# Patient Record
Sex: MALE | Race: WHITE | NOT HISPANIC OR LATINO | Employment: OTHER | URBAN - METROPOLITAN AREA
[De-identification: names, ages, dates, MRNs, and addresses within clinical notes are randomized per-mention and may not be internally consistent; named-entity substitution may affect disease eponyms.]

---

## 2017-04-07 ENCOUNTER — APPOINTMENT (OUTPATIENT)
Dept: RADIOLOGY | Facility: MEDICAL CENTER | Age: 72
End: 2017-04-07
Attending: EMERGENCY MEDICINE
Payer: MEDICARE

## 2017-04-07 ENCOUNTER — HOSPITAL ENCOUNTER (EMERGENCY)
Facility: MEDICAL CENTER | Age: 72
End: 2017-04-07
Attending: EMERGENCY MEDICINE
Payer: MEDICARE

## 2017-04-07 VITALS
OXYGEN SATURATION: 100 % | HEART RATE: 47 BPM | TEMPERATURE: 97.8 F | SYSTOLIC BLOOD PRESSURE: 151 MMHG | WEIGHT: 209.44 LBS | BODY MASS INDEX: 26.88 KG/M2 | RESPIRATION RATE: 20 BRPM | HEIGHT: 74 IN | DIASTOLIC BLOOD PRESSURE: 103 MMHG

## 2017-04-07 DIAGNOSIS — I10 ESSENTIAL HYPERTENSION: ICD-10-CM

## 2017-04-07 DIAGNOSIS — I48.20 CHRONIC ATRIAL FIBRILLATION (HCC): ICD-10-CM

## 2017-04-07 DIAGNOSIS — G44.201 INTRACTABLE TENSION-TYPE HEADACHE, UNSPECIFIED CHRONICITY PATTERN: ICD-10-CM

## 2017-04-07 LAB
ALBUMIN SERPL BCP-MCNC: 4.4 G/DL (ref 3.2–4.9)
ALBUMIN/GLOB SERPL: 1.6 G/DL
ALP SERPL-CCNC: 63 U/L (ref 30–99)
ALT SERPL-CCNC: 18 U/L (ref 2–50)
ANION GAP SERPL CALC-SCNC: 5 MMOL/L (ref 0–11.9)
APTT PPP: 32.6 SEC (ref 24.7–36)
AST SERPL-CCNC: 20 U/L (ref 12–45)
BASOPHILS # BLD AUTO: 0.4 % (ref 0–1.8)
BASOPHILS # BLD: 0.03 K/UL (ref 0–0.12)
BILIRUB SERPL-MCNC: 0.7 MG/DL (ref 0.1–1.5)
BUN SERPL-MCNC: 26 MG/DL (ref 8–22)
CALCIUM SERPL-MCNC: 9.4 MG/DL (ref 8.5–10.5)
CHLORIDE SERPL-SCNC: 111 MMOL/L (ref 96–112)
CO2 SERPL-SCNC: 22 MMOL/L (ref 20–33)
CREAT SERPL-MCNC: 1.12 MG/DL (ref 0.5–1.4)
EOSINOPHIL # BLD AUTO: 0.13 K/UL (ref 0–0.51)
EOSINOPHIL NFR BLD: 1.7 % (ref 0–6.9)
ERYTHROCYTE [DISTWIDTH] IN BLOOD BY AUTOMATED COUNT: 49.9 FL (ref 35.9–50)
GFR SERPL CREATININE-BSD FRML MDRD: >60 ML/MIN/1.73 M 2
GLOBULIN SER CALC-MCNC: 2.8 G/DL (ref 1.9–3.5)
GLUCOSE SERPL-MCNC: 132 MG/DL (ref 65–99)
HCT VFR BLD AUTO: 40.6 % (ref 42–52)
HGB BLD-MCNC: 13.3 G/DL (ref 14–18)
IMM GRANULOCYTES # BLD AUTO: 0.02 K/UL (ref 0–0.11)
IMM GRANULOCYTES NFR BLD AUTO: 0.3 % (ref 0–0.9)
INR PPP: 2.25 (ref 0.87–1.13)
LYMPHOCYTES # BLD AUTO: 1.8 K/UL (ref 1–4.8)
LYMPHOCYTES NFR BLD: 23.6 % (ref 22–41)
MCH RBC QN AUTO: 30.5 PG (ref 27–33)
MCHC RBC AUTO-ENTMCNC: 32.8 G/DL (ref 33.7–35.3)
MCV RBC AUTO: 93.1 FL (ref 81.4–97.8)
MONOCYTES # BLD AUTO: 0.55 K/UL (ref 0–0.85)
MONOCYTES NFR BLD AUTO: 7.2 % (ref 0–13.4)
NEUTROPHILS # BLD AUTO: 5.1 K/UL (ref 1.82–7.42)
NEUTROPHILS NFR BLD: 66.8 % (ref 44–72)
NRBC # BLD AUTO: 0 K/UL
NRBC BLD AUTO-RTO: 0 /100 WBC
PLATELET # BLD AUTO: 166 K/UL (ref 164–446)
PMV BLD AUTO: 10.3 FL (ref 9–12.9)
POTASSIUM SERPL-SCNC: 4.1 MMOL/L (ref 3.6–5.5)
PROT SERPL-MCNC: 7.2 G/DL (ref 6–8.2)
PROTHROMBIN TIME: 25.6 SEC (ref 12–14.6)
RBC # BLD AUTO: 4.36 M/UL (ref 4.7–6.1)
SODIUM SERPL-SCNC: 138 MMOL/L (ref 135–145)
TROPONIN I SERPL-MCNC: 0.02 NG/ML (ref 0–0.04)
WBC # BLD AUTO: 7.6 K/UL (ref 4.8–10.8)

## 2017-04-07 PROCEDURE — 99284 EMERGENCY DEPT VISIT MOD MDM: CPT

## 2017-04-07 PROCEDURE — 85610 PROTHROMBIN TIME: CPT

## 2017-04-07 PROCEDURE — 85025 COMPLETE CBC W/AUTO DIFF WBC: CPT

## 2017-04-07 PROCEDURE — 84484 ASSAY OF TROPONIN QUANT: CPT

## 2017-04-07 PROCEDURE — 36415 COLL VENOUS BLD VENIPUNCTURE: CPT

## 2017-04-07 PROCEDURE — 93005 ELECTROCARDIOGRAM TRACING: CPT | Performed by: EMERGENCY MEDICINE

## 2017-04-07 PROCEDURE — 80053 COMPREHEN METABOLIC PANEL: CPT

## 2017-04-07 PROCEDURE — 70450 CT HEAD/BRAIN W/O DYE: CPT

## 2017-04-07 PROCEDURE — 71010 DX-CHEST-PORTABLE (1 VIEW): CPT

## 2017-04-07 PROCEDURE — 85730 THROMBOPLASTIN TIME PARTIAL: CPT

## 2017-04-07 RX ORDER — IBUPROFEN 400 MG/1
1000 TABLET ORAL 2 TIMES DAILY
COMMUNITY

## 2017-04-07 RX ORDER — LOSARTAN POTASSIUM 50 MG/1
100 TABLET ORAL DAILY
COMMUNITY

## 2017-04-07 RX ORDER — GLIMEPIRIDE 4 MG/1
4 TABLET ORAL EVERY MORNING
COMMUNITY

## 2017-04-07 RX ORDER — CLONIDINE HYDROCHLORIDE 0.1 MG/1
0.1 TABLET ORAL 2 TIMES DAILY
Qty: 30 TAB | Refills: 0 | Status: SHIPPED | OUTPATIENT
Start: 2017-04-07

## 2017-04-07 ASSESSMENT — PAIN SCALES - GENERAL: PAINLEVEL_OUTOF10: 3

## 2017-04-07 NOTE — ED NOTES
Pt ambulated to triage with   Chief Complaint   Patient presents with   • Head Ache     for 3 weeks   • Blood Pressure Problem     as high as 208/114   • Other     visiting here from [place] for the last 3 wks.  pt reports BP started increasing prior to leaving and PCP increased his BP medications prior to traveling.     Pt reports BP improves after exercise.  Pt has h/o CVA, AFib and HTN.  Pt taking medications as prescribed.  Pt on coumadin for afib.  Pt reports general ha, pt denies vision changes.  No N/T or weakness.  Pt Informed regarding triage process and verbalized understanding to inform triage tech or RN for any changes in condition. Placed in lobby.

## 2017-04-07 NOTE — ED AVS SNAPSHOT
Home Care Instructions                                                                                                                Rui Miller   MRN: [id number]    Department:  [facility], Emergency Dept   Date of Visit:  4/7/2017            [facility], Emergency Dept    [address] [place] [id number]    Phone:  [phone]      You were seen by     Neal Rincon M.D.      Your Diagnosis Was     Intractable tension-type headache, unspecified chronicity pattern     G44.201       Follow-up Information     1. Follow up with [facility], Emergency Dept In 1 day.    Specialty:  Emergency Medicine    Why:  As needed, If symptoms worsen    Contact information    [address] [place] [id number] [phone]      Medication Information     Review all of your home medications and newly ordered medications with your primary doctor and/or pharmacist as soon as possible. Follow medication instructions as directed by your doctor and/or pharmacist.     Please keep your complete medication list with you and share with your physician. Update the information when medications are discontinued, doses are changed, or new medications (including over-the-counter products) are added; and carry medication information at all times in the event of emergency situations.               Medication List      START taking these medications        Instructions    Morning Afternoon Evening Bedtime    clonidine 0.1 MG Tabs   Commonly known as:  CATAPRES        Doctor's comments:  As needed twice daily for systolic blood pressures greater than 160   Take 1 Tab by mouth 2 times a day.   Dose:  0.1 mg                          ASK your doctor about these medications        Instructions    Morning Afternoon Evening Bedtime    glimepiride 4 MG Tabs   Commonly known as:  AMARYL        Take 4 mg by mouth every morning.   Dose:  4 mg                        ibuprofen 400 MG  Tabs   Commonly known as:  MOTRIN        Take 1,000 mg by mouth 2 times a day.   Dose:  1000 mg                        losartan 50 MG Tabs   Commonly known as:  COZAAR        Take 100 mg by mouth every day.   Dose:  100 mg                        NON SPECIFIED        Indications: a statin                             Where to Get Your Medications      You can get these medications from any pharmacy     Bring a paper prescription for each of these medications    - clonidine 0.1 MG Tabs            Procedures and tests performed during your visit     APTT    CBC WITH DIFFERENTIAL    COMP METABOLIC PANEL    CT-HEAD W/O    DX-CHEST-PORTABLE (1 VIEW)    EKG (ER)    ESTIMATED GFR    PROTHROMBIN TIME    SALINE LOCK    TROPONIN        Discharge Instructions       Atrial Fibrillation  Atrial fibrillation is a condition that causes your heart to beat irregularly. It may also cause your heart to beat faster than normal. Atrial fibrillation can prevent your heart from pumping blood normally. It increases your risk of stroke and heart problems.  HOME CARE  · Take medications as told by your doctor.  · Only take medications that your doctor says are safe. Some medications can make the condition worse or happen again.  · If blood thinners were prescribed by your doctor, take them exactly as told. Too much can cause bleeding. Too little and you will not have the needed protection against stroke and other problems.  · Perform blood tests at home if told by your doctor.  · Perform blood tests exactly as told by your doctor.  · Do not drink alcohol.  · Do not drink beverages with caffeine such as coffee, soda, and some teas.  · Maintain a healthy weight.  · Do not use diet pills unless your doctor says they are safe. They may make heart problems worse.  · Follow diet instructions as told by your doctor.  · Exercise regularly as told by your doctor.  · Keep all follow-up appointments.  GET HELP IF:  · You notice a change in the speed,  "rhythm, or strength of your heartbeat.  · You suddenly begin peeing (urinating) more often.  · You get tired more easily when moving or exercising.  GET HELP RIGHT AWAY IF:   · You have chest or belly (abdominal) pain.  · You feel sick to your stomach (nauseous).  · You are short of breath.  · You suddenly have swollen feet and ankles.  · You feel dizzy.  · You face, arms, or legs feel numb or weak.  · There is a change in your vision or speech.  MAKE SURE YOU:   · Understand these instructions.  · Will watch your condition.  · Will get help right away if you are not doing well or get worse.     This information is not intended to replace advice given to you by your health care provider. Make sure you discuss any questions you have with your health care provider.     Document Released: 09/26/2009 Document Revised: 01/08/2016 Document Reviewed: 04/13/2016  [employer] Interactive Patient Education ©2016 [employer]  Headaches, Frequently Asked Questions  MIGRAINE HEADACHES  Q: What is migraine? What causes it? How can I treat it?  A: Generally, migraine headaches begin as a dull ache. Then they develop into a constant, throbbing, and pulsating pain. You may experience pain at the temples. You may experience pain at the front or back of one or both sides of the head. The pain is usually accompanied by a combination of:  · Nausea.   · Vomiting.   · Sensitivity to light and noise.   Some people (about 15%) experience an aura (see below) before an attack. The cause of migraine is believed to be chemical reactions in the brain. Treatment for migraine may include over-the-counter or prescription medications. It may also include self-help techniques. These include relaxation training and biofeedback.   Q: What is an aura?  A: About 15% of people with migraine get an \"aura\". This is a sign of neurological symptoms that occur before a migraine headache. You may see wavy or jagged lines, dots, or flashing lights. You might " "experience tunnel vision or blind spots in one or both eyes. The aura can include visual or auditory hallucinations (something imagined). It may include disruptions in smell (such as strange odors), taste or touch. Other symptoms include:  · Numbness.   · A \"pins and needles\" sensation.   · Difficulty in recalling or speaking the correct word.   These neurological events may last as long as 60 minutes. These symptoms will fade as the headache begins.  Q: What is a trigger?  A: Certain physical or environmental factors can lead to or \"trigger\" a migraine. These include:  · Foods.   · Hormonal changes.   · Weather.   · Stress.   It is important to remember that triggers are different for everyone. To help prevent migraine attacks, you need to figure out which triggers affect you. Keep a headache diary. This is a good way to track triggers. The diary will help you talk to your healthcare professional about your condition.  Q: Does weather affect migraines?  A: Bright sunshine, hot, humid conditions, and drastic changes in barometric pressure may lead to, or \"trigger,\" a migraine attack in some people. But studies have shown that weather does not act as a trigger for everyone with migraines.  Q: What is the link between migraine and hormones?  A: Hormones start and regulate many of your body's functions. Hormones keep your body in balance within a constantly changing environment. The levels of hormones in your body are unbalanced at times. Examples are during menstruation, pregnancy, or menopause. That can lead to a migraine attack. In fact, about three quarters of all women with migraine report that their attacks are related to the menstrual cycle.   Q: Is there an increased risk of stroke for migraine sufferers?  A: The likelihood of a migraine attack causing a stroke is very remote. That is not to say that migraine sufferers cannot have a stroke associated with their migraines. In persons under age 40, the most " "common associated factor for stroke is migraine headache. But over the course of a person's normal life span, the occurrence of migraine headache may actually be associated with a reduced risk of dying from cerebrovascular disease due to stroke.   Q: What are acute medications for migraine?  A: Acute medications are used to treat the pain of the headache after it has started. Examples over-the-counter medications, NSAIDs, ergots, and triptans.   Q: What are the triptans?  A: Triptans are the newest class of abortive medications. They are specifically targeted to treat migraine. Triptans are vasoconstrictors. They moderate some chemical reactions in the brain. The triptans work on receptors in your brain. Triptans help to restore the balance of a neurotransmitter called serotonin. Fluctuations in levels of serotonin are thought to be a main cause of migraine.   Q: Are over-the-counter medications for migraine effective?  A: Over-the-counter, or \"OTC,\" medications may be effective in relieving mild to moderate pain and associated symptoms of migraine. But you should see your caregiver before beginning any treatment regimen for migraine.   Q: What are preventive medications for migraine?  A: Preventive medications for migraine are sometimes referred to as \"prophylactic\" treatments. They are used to reduce the frequency, severity, and length of migraine attacks. Examples of preventive medications include antiepileptic medications, antidepressants, beta-blockers, calcium channel blockers, and NSAIDs (nonsteroidal anti-inflammatory drugs).  Q: Why are anticonvulsants used to treat migraine?  A: During the past few years, there has been an increased interest in antiepileptic drugs for the prevention of migraine. They are sometimes referred to as \"anticonvulsants\". Both epilepsy and migraine may be caused by similar reactions in the brain.   Q: Why are antidepressants used to treat migraine?  A: Antidepressants are " "typically used to treat people with depression. They may reduce migraine frequency by regulating chemical levels, such as serotonin, in the brain.   Q: What alternative therapies are used to treat migraine?  A: The term \"alternative therapies\" is often used to describe treatments considered outside the scope of conventional Western medicine. Examples of alternative therapy include acupuncture, acupressure, and yoga. Another common alternative treatment is herbal therapy. Some herbs are believed to relieve headache pain. Always discuss alternative therapies with your caregiver before proceeding. Some herbal products contain arsenic and other toxins.  TENSION HEADACHES  Q: What is a tension-type headache? What causes it? How can I treat it?  A: Tension-type headaches occur randomly. They are often the result of temporary stress, anxiety, fatigue, or anger. Symptoms include soreness in your temples, a tightening band-like sensation around your head (a \"vice-like\" ache). Symptoms can also include a pulling feeling, pressure sensations, and joe head and neck muscles. The headache begins in your forehead, temples, or the back of your head and neck. Treatment for tension-type headache may include over-the-counter or prescription medications. Treatment may also include self-help techniques such as relaxation training and biofeedback.  CLUSTER HEADACHES  Q: What is a cluster headache? What causes it? How can I treat it?  A: Cluster headache gets its name because the attacks come in groups. The pain arrives with little, if any, warning. It is usually on one side of the head. A tearing or bloodshot eye and a runny nose on the same side of the headache may also accompany the pain. Cluster headaches are believed to be caused by chemical reactions in the brain. They have been described as the most severe and intense of any headache type. Treatment for cluster headache includes prescription medication and oxygen.  SINUS " "HEADACHES  Q: What is a sinus headache? What causes it? How can I treat it?  A: When a cavity in the bones of the face and skull (a sinus) becomes inflamed, the inflammation will cause localized pain. This condition is usually the result of an allergic reaction, a tumor, or an infection. If your headache is caused by a sinus blockage, such as an infection, you will probably have a fever. An x-ray will confirm a sinus blockage. Your caregiver's treatment might include antibiotics for the infection, as well as antihistamines or decongestants.   REBOUND HEADACHES  Q: What is a rebound headache? What causes it? How can I treat it?  A: A pattern of taking acute headache medications too often can lead to a condition known as \"rebound headache.\" A pattern of taking too much headache medication includes taking it more than 2 days per week or in excessive amounts. That means more than the label or a caregiver advises. With rebound headaches, your medications not only stop relieving pain, they actually begin to cause headaches. Doctors treat rebound headache by tapering the medication that is being overused. Sometimes your caregiver will gradually substitute a different type of treatment or medication. Stopping may be a challenge. Regularly overusing a medication increases the potential for serious side effects. Consult a caregiver if you regularly use headache medications more than 2 days per week or more than the label advises.  ADDITIONAL QUESTIONS AND ANSWERS  Q: What is biofeedback?  A: Biofeedback is a self-help treatment. Biofeedback uses special equipment to monitor your body's involuntary physical responses. Biofeedback monitors:  · Breathing.   · Pulse.   · Heart rate.   · Temperature.   · Muscle tension.   · Brain activity.   Biofeedback helps you refine and perfect your relaxation exercises. You learn to control the physical responses that are related to stress. Once the technique has been mastered, you do not " need the equipment any more.  Q: Are headaches hereditary?  A: Four out of five (80%) of people that suffer report a family history of migraine. Scientists are not sure if this is genetic or a family predisposition. Despite the uncertainty, a child has a 50% chance of having migraine if one parent suffers. The child has a 75% chance if both parents suffer.   Q: Can children get headaches?  A: By the time they reach high school, most young people have experienced some type of headache. Many safe and effective approaches or medications can prevent a headache from occurring or stop it after it has begun.   Q: What type of doctor should I see to diagnose and treat my headache?  A: Start with your primary caregiver. Discuss his or her experience and approach to headaches. Discuss methods of classification, diagnosis, and treatment. Your caregiver may decide to recommend you to a headache specialist, depending upon your symptoms or other physical conditions. Having diabetes, allergies, etc., may require a more comprehensive and inclusive approach to your headache. The National Headache Foundation will provide, upon request, a list of NHF physician members in your state.  Document Released: 03/09/2005 Document Revised: 03/11/2013 Document Reviewed: 08/17/2009  [employer]® Patient Information ©2013 [employer]          Patient Information     Patient Information    Following emergency treatment: all patient requiring follow-up care must return either to a private physician or a clinic if your condition worsens before you are able to obtain further medical attention, please return to the emergency room.     Billing Information    At [facility], we work to make the billing process streamlined for our patients.  Our Representatives are here to answer any questions you may have regarding your hospital bill.  If you have insurance coverage and have supplied your insurance information to us, we will submit a claim to your  insurer on your behalf.  Should you have any questions regarding your bill, we can be reached online or by phone as follows:  Online: You are able pay your bills online or live chat with our representatives about any billing questions you may have. We are here to help Monday - Friday from 8:00am to 7:30pm and 9:00am - 12:00pm on Saturdays.  Please visit [email or web address]  for more information.   Phone:  [phone] or [phone]    Please note that your emergency physician, surgeon, pathologist, radiologist, anesthesiologist, and other specialists are not employed by [facility] and will therefore bill separately for their services.  Please contact them directly for any questions concerning their bills at the numbers below:     Emergency Physician Services:  [phone]  [facility]:  [phone]  [facility]:  [phone]  [facility]:  [phone]    1. Your final bill may vary from the amount quoted upon discharge if all procedures are not complete at that time, or if your doctor has additional procedures of which we are not aware. You will receive an additional bill if you return to the Emergency Department at [facility] for suture removal regardless of the facility of which the sutures were placed.     2. Please arrange for settlement of this account at the emergency registration.    3. All self-pay accounts are due in full at the time of treatment.  If you are unable to meet this obligation then payment is expected within 4-5 days.     4. If you have had radiology studies (CT, X-ray, Ultrasound, MRI), you have received a preliminary result during your emergency department visit. Please contact the radiology department [phone] to receive a copy of your final result. Please discuss the Final result with your primary physician or with the follow up physician provided.     Crisis Hotline:  National Crisis Hotline:   9-100-QHYIPPP or 1-924.392.5698  [place] Crisis Hotline:    1-639.360.1448 or 383-217-2587         ED Discharge Follow Up Questions    1. In order to provide you with very good care, we would like to follow up with a phone call in the next few days.  May we have your permission to contact you?     YES /  NO    2. What is the best phone number to call you? (       )_____-__________    3. What is the best time to call you?      Morning  /  Afternoon  /  Evening                   Patient Signature:  ____________________________________________________________    Date:  ____________________________________________________________

## 2017-04-07 NOTE — ED AVS SNAPSHOT
4/7/2017          Rui Miller  [address] [place] [address]    Dear Rui:    [facility] wants to ensure your discharge home is safe and you or your loved ones have had all your questions answered regarding your care after you leave the hospital.    You may receive a telephone call within two days of your discharge.  This call is to make certain you understand your discharge instructions as well as ensure we provided you with the best care possible during your stay with us.     The call will only last approximately 3-5 minutes and will be done by a nurse.    Once again, we want to ensure your discharge home is safe and that you have a clear understanding of any next steps in your care.  If you have any questions or concerns, please do not hesitate to contact us, we are here for you.  Thank you for choosing [facility] for your healthcare needs.    Sincerely,    Michel Chowdhury    [facility]

## 2017-04-07 NOTE — ED AVS SNAPSHOT
VoyageByMe Access Code: [email or web address]  Expires: 5/7/2017  8:30 PM    VoyageByMe  A secure, online tool to manage your health information     [employer]’s VoyageByMe® is a secure, online tool that connects you to your personalized health information from the privacy of your home -- day or night - making it very easy for you to manage your healthcare. Once the activation process is completed, you can even access your medical information using the VoyageByMe eloisa, which is available for free in the [employer] Eloisa store or Google Play store.     VoyageByMe provides the following levels of access (as shown below):   My Chart Features   [facility] Primary Care Doctor [facility]  Specialists [facility]  Urgent  Care Non-[facility]  Primary Care  Doctor   Email your healthcare team securely and privately 24/7 X X X X   Manage appointments: schedule your next appointment; view details of past/upcoming appointments X      Request prescription refills. X      View recent personal medical records, including lab and immunizations X X X X   View health record, including health history, allergies, medications X X X X   Read reports about your outpatient visits, procedures, consult and ER notes X X X X   See your discharge summary, which is a recap of your hospital and/or ER visit that includes your diagnosis, lab results, and care plan. X X       How to register for VoyageByMe:  1. Go to  [email or web address].  2. Click on the Sign Up Now box, which takes you to the New Member Sign Up page. You will need to provide the following information:  a. Enter your VoyageByMe Access Code exactly as it appears at the top of this page. (You will not need to use this code after you’ve completed the sign-up process. If you do not sign up before the expiration date, you must request a new code.)   b. Enter your date of birth.   c. Enter your home email address.   d. Click Submit, and follow the next screen’s instructions.  3. Create a VoyageByMe ID. This will be your VoyageByMe  login ID and cannot be changed, so think of one that is secure and easy to remember.  4. Create a [employer] password. You can change your password at any time.  5. Enter your Password Reset Question and Answer. This can be used at a later time if you forget your password.   6. Enter your e-mail address. This allows you to receive e-mail notifications when new information is available in [employer].  7. Click Sign Up. You can now view your health information.    For assistance activating your [employer] account, call [phone]

## 2017-04-08 NOTE — DISCHARGE INSTRUCTIONS
Atrial Fibrillation  Atrial fibrillation is a condition that causes your heart to beat irregularly. It may also cause your heart to beat faster than normal. Atrial fibrillation can prevent your heart from pumping blood normally. It increases your risk of stroke and heart problems.  HOME CARE  · Take medications as told by your doctor.  · Only take medications that your doctor says are safe. Some medications can make the condition worse or happen again.  · If blood thinners were prescribed by your doctor, take them exactly as told. Too much can cause bleeding. Too little and you will not have the needed protection against stroke and other problems.  · Perform blood tests at home if told by your doctor.  · Perform blood tests exactly as told by your doctor.  · Do not drink alcohol.  · Do not drink beverages with caffeine such as coffee, soda, and some teas.  · Maintain a healthy weight.  · Do not use diet pills unless your doctor says they are safe. They may make heart problems worse.  · Follow diet instructions as told by your doctor.  · Exercise regularly as told by your doctor.  · Keep all follow-up appointments.  GET HELP IF:  · You notice a change in the speed, rhythm, or strength of your heartbeat.  · You suddenly begin peeing (urinating) more often.  · You get tired more easily when moving or exercising.  GET HELP RIGHT AWAY IF:   · You have chest or belly (abdominal) pain.  · You feel sick to your stomach (nauseous).  · You are short of breath.  · You suddenly have swollen feet and ankles.  · You feel dizzy.  · You face, arms, or legs feel numb or weak.  · There is a change in your vision or speech.  MAKE SURE YOU:   · Understand these instructions.  · Will watch your condition.  · Will get help right away if you are not doing well or get worse.     This information is not intended to replace advice given to you by your health care provider. Make sure you discuss any questions you have with your health care  "provider.     Document Released: 09/26/2009 Document Revised: 01/08/2016 Document Reviewed: 04/13/2016  [employer] Interactive Patient Education ©2016 [employer]  Headaches, Frequently Asked Questions  MIGRAINE HEADACHES  Q: What is migraine? What causes it? How can I treat it?  A: Generally, migraine headaches begin as a dull ache. Then they develop into a constant, throbbing, and pulsating pain. You may experience pain at the temples. You may experience pain at the front or back of one or both sides of the head. The pain is usually accompanied by a combination of:  · Nausea.   · Vomiting.   · Sensitivity to light and noise.   Some people (about 15%) experience an aura (see below) before an attack. The cause of migraine is believed to be chemical reactions in the brain. Treatment for migraine may include over-the-counter or prescription medications. It may also include self-help techniques. These include relaxation training and biofeedback.   Q: What is an aura?  A: About 15% of people with migraine get an \"aura\". This is a sign of neurological symptoms that occur before a migraine headache. You may see wavy or jagged lines, dots, or flashing lights. You might experience tunnel vision or blind spots in one or both eyes. The aura can include visual or auditory hallucinations (something imagined). It may include disruptions in smell (such as strange odors), taste or touch. Other symptoms include:  · Numbness.   · A \"pins and needles\" sensation.   · Difficulty in recalling or speaking the correct word.   These neurological events may last as long as 60 minutes. These symptoms will fade as the headache begins.  Q: What is a trigger?  A: Certain physical or environmental factors can lead to or \"trigger\" a migraine. These include:  · Foods.   · Hormonal changes.   · Weather.   · Stress.   It is important to remember that triggers are different for everyone. To help prevent migraine attacks, you need to figure out which " "triggers affect you. Keep a headache diary. This is a good way to track triggers. The diary will help you talk to your healthcare professional about your condition.  Q: Does weather affect migraines?  A: Bright sunshine, hot, humid conditions, and drastic changes in barometric pressure may lead to, or \"trigger,\" a migraine attack in some people. But studies have shown that weather does not act as a trigger for everyone with migraines.  Q: What is the link between migraine and hormones?  A: Hormones start and regulate many of your body's functions. Hormones keep your body in balance within a constantly changing environment. The levels of hormones in your body are unbalanced at times. Examples are during menstruation, pregnancy, or menopause. That can lead to a migraine attack. In fact, about three quarters of all women with migraine report that their attacks are related to the menstrual cycle.   Q: Is there an increased risk of stroke for migraine sufferers?  A: The likelihood of a migraine attack causing a stroke is very remote. That is not to say that migraine sufferers cannot have a stroke associated with their migraines. In persons under age 40, the most common associated factor for stroke is migraine headache. But over the course of a person's normal life span, the occurrence of migraine headache may actually be associated with a reduced risk of dying from cerebrovascular disease due to stroke.   Q: What are acute medications for migraine?  A: Acute medications are used to treat the pain of the headache after it has started. Examples over-the-counter medications, NSAIDs, ergots, and triptans.   Q: What are the triptans?  A: Triptans are the newest class of abortive medications. They are specifically targeted to treat migraine. Triptans are vasoconstrictors. They moderate some chemical reactions in the brain. The triptans work on receptors in your brain. Triptans help to restore the balance of a neurotransmitter " "called serotonin. Fluctuations in levels of serotonin are thought to be a main cause of migraine.   Q: Are over-the-counter medications for migraine effective?  A: Over-the-counter, or \"OTC,\" medications may be effective in relieving mild to moderate pain and associated symptoms of migraine. But you should see your caregiver before beginning any treatment regimen for migraine.   Q: What are preventive medications for migraine?  A: Preventive medications for migraine are sometimes referred to as \"prophylactic\" treatments. They are used to reduce the frequency, severity, and length of migraine attacks. Examples of preventive medications include antiepileptic medications, antidepressants, beta-blockers, calcium channel blockers, and NSAIDs (nonsteroidal anti-inflammatory drugs).  Q: Why are anticonvulsants used to treat migraine?  A: During the past few years, there has been an increased interest in antiepileptic drugs for the prevention of migraine. They are sometimes referred to as \"anticonvulsants\". Both epilepsy and migraine may be caused by similar reactions in the brain.   Q: Why are antidepressants used to treat migraine?  A: Antidepressants are typically used to treat people with depression. They may reduce migraine frequency by regulating chemical levels, such as serotonin, in the brain.   Q: What alternative therapies are used to treat migraine?  A: The term \"alternative therapies\" is often used to describe treatments considered outside the scope of conventional Western medicine. Examples of alternative therapy include acupuncture, acupressure, and yoga. Another common alternative treatment is herbal therapy. Some herbs are believed to relieve headache pain. Always discuss alternative therapies with your caregiver before proceeding. Some herbal products contain arsenic and other toxins.  TENSION HEADACHES  Q: What is a tension-type headache? What causes it? How can I treat it?  A: Tension-type headaches occur " "randomly. They are often the result of temporary stress, anxiety, fatigue, or anger. Symptoms include soreness in your temples, a tightening band-like sensation around your head (a \"vice-like\" ache). Symptoms can also include a pulling feeling, pressure sensations, and joe head and neck muscles. The headache begins in your forehead, temples, or the back of your head and neck. Treatment for tension-type headache may include over-the-counter or prescription medications. Treatment may also include self-help techniques such as relaxation training and biofeedback.  CLUSTER HEADACHES  Q: What is a cluster headache? What causes it? How can I treat it?  A: Cluster headache gets its name because the attacks come in groups. The pain arrives with little, if any, warning. It is usually on one side of the head. A tearing or bloodshot eye and a runny nose on the same side of the headache may also accompany the pain. Cluster headaches are believed to be caused by chemical reactions in the brain. They have been described as the most severe and intense of any headache type. Treatment for cluster headache includes prescription medication and oxygen.  SINUS HEADACHES  Q: What is a sinus headache? What causes it? How can I treat it?  A: When a cavity in the bones of the face and skull (a sinus) becomes inflamed, the inflammation will cause localized pain. This condition is usually the result of an allergic reaction, a tumor, or an infection. If your headache is caused by a sinus blockage, such as an infection, you will probably have a fever. An x-ray will confirm a sinus blockage. Your caregiver's treatment might include antibiotics for the infection, as well as antihistamines or decongestants.   REBOUND HEADACHES  Q: What is a rebound headache? What causes it? How can I treat it?  A: A pattern of taking acute headache medications too often can lead to a condition known as \"rebound headache.\" A pattern of taking too much " headache medication includes taking it more than 2 days per week or in excessive amounts. That means more than the label or a caregiver advises. With rebound headaches, your medications not only stop relieving pain, they actually begin to cause headaches. Doctors treat rebound headache by tapering the medication that is being overused. Sometimes your caregiver will gradually substitute a different type of treatment or medication. Stopping may be a challenge. Regularly overusing a medication increases the potential for serious side effects. Consult a caregiver if you regularly use headache medications more than 2 days per week or more than the label advises.  ADDITIONAL QUESTIONS AND ANSWERS  Q: What is biofeedback?  A: Biofeedback is a self-help treatment. Biofeedback uses special equipment to monitor your body's involuntary physical responses. Biofeedback monitors:  · Breathing.   · Pulse.   · Heart rate.   · Temperature.   · Muscle tension.   · Brain activity.   Biofeedback helps you refine and perfect your relaxation exercises. You learn to control the physical responses that are related to stress. Once the technique has been mastered, you do not need the equipment any more.  Q: Are headaches hereditary?  A: Four out of five (80%) of people that suffer report a family history of migraine. Scientists are not sure if this is genetic or a family predisposition. Despite the uncertainty, a child has a 50% chance of having migraine if one parent suffers. The child has a 75% chance if both parents suffer.   Q: Can children get headaches?  A: By the time they reach high school, most young people have experienced some type of headache. Many safe and effective approaches or medications can prevent a headache from occurring or stop it after it has begun.   Q: What type of doctor should I see to diagnose and treat my headache?  A: Start with your primary caregiver. Discuss his or her experience and approach to headaches.  Discuss methods of classification, diagnosis, and treatment. Your caregiver may decide to recommend you to a headache specialist, depending upon your symptoms or other physical conditions. Having diabetes, allergies, etc., may require a more comprehensive and inclusive approach to your headache. The National [employer] will provide, upon request, a list of NHF physician members in your state.  Document Released: 03/09/2005 Document Revised: 03/11/2013 Document Reviewed: 08/17/2009  [employer]® Patient Information ©2013 [employer].

## 2017-04-08 NOTE — ED PROVIDER NOTES
ED Provider Note    CHIEF COMPLAINT  Chief Complaint   Patient presents with   • Head Ache     for 3 weeks   • Blood Pressure Problem     as high as 208/114   • Other     visiting here from [place] for the last 3 wks.  pt reports BP started increasing prior to leaving and PCP increased his BP medications prior to traveling.       HPI  Rui Miller is a 72 y.o. male who presents for evaluation of headache, elevated blood pressure not feeling well. The patient is visiting from [place]. History of type II diabetes, hypertension atrial fibrillation. He takes Coumadin. He has been compliant with all of his medications. He reports having mild headache. No recent trauma. No numbness weakness or tingling. No double vision. He reports his blood pressure was profound elevated prior to arrival at home upwards of 208/114. No chest pain or shortness of breath or leg swelling. He has no other complaints at this time. Headache is 5 out of 10 nothing makes it better or worse no associated neck stiffness fevers chills or facial pain    REVIEW OF SYSTEMS  See HPI for further details. No night sweats or weight loss numbness tingling or weakness All other systems are negative.     PAST MEDICAL HISTORY  Past Medical History   Diagnosis Date   • Atrial fibrillation (CMS-HCC)    • Hypertension      Atrial fibrillation type II diabetes hypertension  FAMILY HISTORY  No history of bleeding disorder    SOCIAL HISTORY  Social History     Social History   • Marital Status: Single     Spouse Name: N/A   • Number of Children: N/A   • Years of Education: N/A     Social History Main Topics   • Smoking status: None   • Smokeless tobacco: None   • Alcohol Use: Yes   • Drug Use: No   • Sexual Activity: Not Asked     Other Topics Concern   • None     Social History Narrative   • None     No drugs or alcohol  SURGICAL HISTORY  History reviewed. No pertinent past surgical history.  No surgeries reported  CURRENT MEDICATIONS  Home Medications      "Reviewed by Marley Torres R.N. (Registered Nurse) on 04/07/17 at 1828  Med List Status: Partial    Medication Last Dose Status    glimepiride (AMARYL) 4 MG Tab 4/7/2017 Active    ibuprofen (MOTRIN) 400 MG Tab 4/6/2017 Active    losartan (COZAAR) 50 MG Tab 4/7/2017 Active    NON SPECIFIED 4/6/2017 Active             losartan, metformin, glipizide, Coumadin, some sort of calcium channel blocker or beta blocker the patient cannot recall the name of the medication    ALLERGIES  No Known Allergies    PHYSICAL EXAM  VITAL SIGNS: /103 mmHg  Pulse 47  Temp(Src) 36.6 °C (97.8 °F) (Temporal)  Resp 20  Ht 1.88 m (6' 2\")  Wt 95 kg (209 lb 7 oz)  BMI 26.88 kg/m2  SpO2 100%      Constitutional: Well developed, Well nourished, No acute distress, Non-toxic appearance.   HENT: Normocephalic, Atraumatic, Bilateral external ears normal, Oropharynx moist, No oral exudates, Nose normal.   Eyes: PERRLA, EOMI, Conjunctiva normal, No discharge.   Neck: Normal range of motion, No tenderness, Supple, No stridor.   Cardiovascular: Irregularly irregular, No murmurs, No rubs, No gallops.   Thorax & Lungs: Normal breath sounds, No respiratory distress, No wheezing, No chest tenderness.   Abdomen: Bowel sounds normal, Soft, No tenderness, No masses, No pulsatile masses.   Skin: Warm, Dry, No erythema, No rash.   Extremities: Intact distal pulses, No edema, No tenderness, No cyanosis, No clubbing.   Neurologic: Alert & oriented x 3, Normal motor function, Normal sensory function, No focal deficits noted.   Psychiatric: Affect normal, Judgment normal, Mood normal.     EKG  Interpretation by me. Rate less than 50 atrial fibrillation no acute ST segment elevation or depression or pathological T-wave inversions no ectopy    RADIOLOGY/PROCEDURES  CT-HEAD W/O   Final Result      1.  White matter lucencies most consistent with small vessel ischemic change versus demyelination or gliosis.      2.  No evidence of acute cerebral infarction, " hemorrhage or mass lesion.      DX-CHEST-PORTABLE (1 VIEW)   Final Result      No acute cardiac or pulmonary abnormality is noted. Slight cardiomegaly.          Results for orders placed or performed during the hospital encounter of 04/07/17   CBC WITH DIFFERENTIAL   Result Value Ref Range    WBC 7.6 4.8 - 10.8 K/uL    RBC 4.36 (L) 4.70 - 6.10 M/uL    Hemoglobin 13.3 (L) 14.0 - 18.0 g/dL    Hematocrit 40.6 (L) 42.0 - 52.0 %    MCV 93.1 81.4 - 97.8 fL    MCH 30.5 27.0 - 33.0 pg    MCHC 32.8 (L) 33.7 - 35.3 g/dL    RDW 49.9 35.9 - 50.0 fL    Platelet Count 166 164 - 446 K/uL    MPV 10.3 9.0 - 12.9 fL    Neutrophils-Polys 66.80 44.00 - 72.00 %    Lymphocytes 23.60 22.00 - 41.00 %    Monocytes 7.20 0.00 - 13.40 %    Eosinophils 1.70 0.00 - 6.90 %    Basophils 0.40 0.00 - 1.80 %    Immature Granulocytes 0.30 0.00 - 0.90 %    Nucleated RBC 0.00 /100 WBC    Neutrophils (Absolute) 5.10 1.82 - 7.42 K/uL    Lymphs (Absolute) 1.80 1.00 - 4.80 K/uL    Monos (Absolute) 0.55 0.00 - 0.85 K/uL    Eos (Absolute) 0.13 0.00 - 0.51 K/uL    Baso (Absolute) 0.03 0.00 - 0.12 K/uL    Immature Granulocytes (abs) 0.02 0.00 - 0.11 K/uL    NRBC (Absolute) 0.00 K/uL   COMP METABOLIC PANEL   Result Value Ref Range    Sodium 138 135 - 145 mmol/L    Potassium 4.1 3.6 - 5.5 mmol/L    Chloride 111 96 - 112 mmol/L    Co2 22 20 - 33 mmol/L    Anion Gap 5.0 0.0 - 11.9    Glucose 132 (H) 65 - 99 mg/dL    Bun 26 (H) 8 - 22 mg/dL    Creatinine 1.12 0.50 - 1.40 mg/dL    Calcium 9.4 8.5 - 10.5 mg/dL    AST(SGOT) 20 12 - 45 U/L    ALT(SGPT) 18 2 - 50 U/L    Alkaline Phosphatase 63 30 - 99 U/L    Total Bilirubin 0.7 0.1 - 1.5 mg/dL    Albumin 4.4 3.2 - 4.9 g/dL    Total Protein 7.2 6.0 - 8.2 g/dL    Globulin 2.8 1.9 - 3.5 g/dL    A-G Ratio 1.6 g/dL   TROPONIN   Result Value Ref Range    Troponin I 0.02 0.00 - 0.04 ng/mL   PROTHROMBIN TIME   Result Value Ref Range    PT 25.6 (H) 12.0 - 14.6 sec    INR 2.25 (H) 0.87 - 1.13   APTT   Result Value Ref Range     APTT 32.6 24.7 - 36.0 sec   ESTIMATED GFR   Result Value Ref Range    GFR If African American >60 >60 mL/min/1.73 m 2    GFR If Non African American >60 >60 mL/min/1.73 m 2   EKG (ER)   Result Value Ref Range    Report       [facility] Emergency Dept.    Test Date:  2017  Pt Name:    BOBBY PADILLA              Department: ER  MRN:        [id number]                      Room:        02  Gender:     M                            Technician: [id number]  :        [id number]                   Requested By:EFRAIN MARES  Order #:    [id number]                    Reading MD:    Measurements  Intervals                                Axis  Rate:       59                           P:  AL:                                      QRS:        -10  QRSD:       88                           T:          -12  QT:         500  QTc:        496    Interpretive Statements  ATRIAL FIBRILLATION, V-RATE  45- 61  VENTRICULAR PREMATURE COMPLEX  ABNRM R PROG, CONSIDER ASMI OR LEAD PLACEMENT  BORDERLINE T ABNORMALITIES, INFERIOR LEADS  No previous ECG available for comparison        COURSE & MEDICAL DECISION MAKING  Pertinent Labs & Imaging studies reviewed. (See chart for details)  Patient presented here with headache, he had just profoundly elevated blood pressures earlier today but did not have significantly elevated readings here. My Main concern was spontaneous intracranial hemorrhage due to his Coumadin usage as well as refractory hypertension. Subsequent CT scan did not indicate any evidence of intracranial hemorrhage or edema. Laboratory studies were unremarkable. No evidence of heart failure on chest x-ray, troponin and BNP are normal. He is rate controlled. His kidney function and blood sugar are reasonable and is Coumadin level is in therapeutic range. He is going home in about 2-3 weeks. I'll give him a small prescription of clonidine only to be taken as needed for consistently elevated systolic pressures  greater than 160. Return precautions have been reviewed    FINAL IMPRESSION  1.   1. Intractable tension-type headache, unspecified chronicity pattern    2. Chronic atrial fibrillation (CMS-HCC)    3. Essential hypertension            Electronically signed by: Neal Rincon, 4/7/2017 5:56 PM

## 2017-04-12 LAB — EKG IMPRESSION: NORMAL
